# Patient Record
Sex: FEMALE | Race: WHITE | HISPANIC OR LATINO | Employment: OTHER | URBAN - METROPOLITAN AREA
[De-identification: names, ages, dates, MRNs, and addresses within clinical notes are randomized per-mention and may not be internally consistent; named-entity substitution may affect disease eponyms.]

---

## 2021-04-30 ENCOUNTER — TRANSCRIBE ORDERS (OUTPATIENT)
Dept: PHYSICAL THERAPY | Facility: CLINIC | Age: 51
End: 2021-04-30

## 2021-04-30 DIAGNOSIS — M79.18 MYALGIA, OTHER SITE: Primary | ICD-10-CM

## 2021-05-05 ENCOUNTER — TREATMENT (OUTPATIENT)
Dept: PHYSICAL THERAPY | Facility: CLINIC | Age: 51
End: 2021-05-05

## 2021-05-05 DIAGNOSIS — G89.29 CHRONIC LOW BACK PAIN, UNSPECIFIED BACK PAIN LATERALITY, UNSPECIFIED WHETHER SCIATICA PRESENT: ICD-10-CM

## 2021-05-05 DIAGNOSIS — M54.50 CHRONIC LOW BACK PAIN, UNSPECIFIED BACK PAIN LATERALITY, UNSPECIFIED WHETHER SCIATICA PRESENT: ICD-10-CM

## 2021-05-05 DIAGNOSIS — M79.18 MYALGIA, OTHER SITE: Primary | ICD-10-CM

## 2021-05-05 PROCEDURE — 97110 THERAPEUTIC EXERCISES: CPT | Performed by: PHYSICAL THERAPIST

## 2021-05-05 PROCEDURE — 97162 PT EVAL MOD COMPLEX 30 MIN: CPT | Performed by: PHYSICAL THERAPIST

## 2021-05-05 NOTE — PROGRESS NOTES
Physical Therapy Initial Evaluation and Plan of Care    Patient: Marialuisa Pelletier   : 1970  Diagnosis/ICD-10 Code:  Myalgia, other site [M79.18]  Referring practitioner: RUPA Velez  Date of Initial Visit: 2021  Today's Date: 2021  Patient seen for 1 sessions           Subjective Questionnaire: Oswestry: 36% limited      Subjective Evaluation    History of Present Illness  Mechanism of injury: Portions taken from MD note & confirmed with pt: Pt reports chronic LBP for ~1.5-2 yrs or more with B LE pain & n/t for SPENCER x10 yrs with symptoms more at the feet more recently and into B groin. Testing r/o vascular etiology for leg symptoms. Pt reports tailbone pain with sitting ~1.5 yrs now.  Pt has been seen in pain mgmt and had prior surgical eval with Dr. Scott. Cymbalta was not tolerated due to side effects, Lyrica has not helped, but current med regimen is Gabapentin 800 mg 3x/day and Tramadol 50 mg 2x/day. Pt will have trigger point injex in the lumb region on .    Denies bowel/bladder incontinence, urinary retention or saddle anesthesia    Prior MRI (3/2019) & EMG were unremarkable.    PMH: fibromyalgia, arthritis knees, 'low' uterus removed 4-5 yrs ago, meniscectomy R knee, gall bladder removed    Denies hx: pacemaker, CA, seizures, metal implants, CVA, MI    Pain: 9/10 current, 6/10 at best with meds, 10/10 at worst    Aggravating/functional factors: sitting, standing, walking, weight-bearing, laying down, bending, twisting, squatting, lifting, carrying, washing, dressing, grooming, pushing, pulling, stairs, in/out of car or bed, rising, sleeping, house work, yard work, work activities    PLOF: no prior issues with the above functional activities    Relieving factors: meds    Social Hx: lives with spouse w/adult kids, steps to get into house, 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 (standing, bending, twisting)      Pain  Quality: burning,  sharp, throbbing, dull ache and needle-like    Hand dominance: right    Treatments  Previous treatment: chiropractic (3 yrs)  Patient Goals  Patient goals for therapy: decreased pain and increased strength         Objective          Active Range of Motion     Lumbar   Flexion: 60 degrees with pain  Extension: 20 degrees with pain  Left lateral flexion: 35 degrees with pain  Right lateral flexion: 35 degrees with pain  Left rotation: 30 degrees with pain  Right rotation: 30 degrees with pain    Additional Active Range of Motion Details  Pain with repeated flex & ext but no overall change afterwards    Strength/Myotome Testing     Left Hip   Planes of Motion   Flexion: 5  Internal rotation: 4-    Right Hip   Planes of Motion   Flexion: 4+  Internal rotation: 4-    Left Knee   Flexion: 4+  Extension: 4+    Right Knee   Flexion: 4+  Extension: 4    Left Ankle/Foot   Dorsiflexion: 5  Inversion: 4+  Eversion: 4+  Great toe extension: 4-    Right Ankle/Foot   Dorsiflexion: 5  Inversion: 5  Eversion: 5  Great toe extension: 4    Additional Strength Details  Pain with MMT toe ext B  H/L hip abd maria dolores min/mod resistance & hip add maria dolores min resistance  Hip IR MMT aggravated B knees    Muscle Activation     Additional Muscle Activation Details  Will assess in future visits    Tests     Lumbar     Left   Positive passive SLR.   Negative vertical compression.     Right   Negative vertical compression.     Left Hip   Positive Ely's and femoral nerve tension.   90/90 SLR: Positive.     Right Hip   Positive Ely's and femoral nerve tension.   90/90 SLR: Positive.     Additional Tests Details  Tight hams R side SLR      Observation: shifting weight in sitting to avoid staying in one place too long, also moves legs around frequently during sitting    Palpation: no TTP along thor/lumb paraspinals today, TTP @ sacral ILAs; R on L & R on R sacrum in sacral flex/ext respectively; L PINN; R > L outflare; PIVM more limited L UPAs than  R    Sensation: intact/equal to LT B LEs    Posture: head fwd/rounded shoulders    DTRs: 2+ B LEs patellar/achilles    Clonus: (-)    Gait: I without AD, able to maintain fairly straight path, slightly reduced gt speed    Balance: SLS 5 s L/6 s R on level ground without UE support & SBA    Transfers: supine to/from sit with poor body mechanics      Assessment & Plan     Assessment  Impairments: abnormal coordination, abnormal gait, abnormal muscle firing, abnormal muscle tone, abnormal or restricted ROM, activity intolerance, impaired balance, impaired physical strength, lacks appropriate home exercise program, pain with function, safety issue and weight-bearing intolerance  Assessment details: The patient is a 51 y.o. female who presents to physical therapy today for myalgia & chronic LBP. Upon initial evaluation, the patient demonstrates the above & following impairments: pain, reduced posture, SI/IS dysfunction, (+) SLR L, (+) Femoral n test B, decreased ROM/flexibility, strength, gait, balance and function. Due to these impairments, the patient is unable to/limited with: sitting, standing, walking, weight-bearing, laying down, bending, twisting, squatting, lifting, carrying, washing, dressing, grooming, pushing, pulling, stairs, in/out of car or bed, rising, sleeping, house work, yard work, work activities. The patient would benefit from skilled PT services to address functional limitations and impairments and to improve patient quality of life.      Barriers to therapy: fibromyalgia, arthritis knees and pt's work activities could affect PT Rx/progress/outcomes if exacerbated/unregulated which could affect tolerance to PT/exs or delay healing  Prognosis: good    Goals  Plan Goals: STGs in 4 weeks:  Decrease pain to 6-7/10 on average  Increase trunk/LE ROM by 10 degrees where limited as much  Increase LE strength to 4/5 or better    LTGs by discharge  Increase trunk/LE ROM to WFL/WNL  Increase LE strength to 5/5    Pt will be able to ascend/descend stairs reciprocally with or without use of rail(s) and with minimal difficulty or pain  Pt will be able to sit/drive/ride for 30-60 mins without difficulty or pain  Pt will be able to stand 60 mins for basic ADLs/work activities without difficulty or pain  Pt will be able to walk 30-60 mins for grocery shopping/work activities without difficulty, pain or LOB  Pt will be able to wash/dress/groom without difficulty or increased pain  Pt will be able to lift/carry laundry baskets, pots/pans, garbage or grocery bags without difficulty or increased pain  Pt will be able to sleep full nights most nights without waking from LBP/LE symptoms  Pt will be able to perform job activities with minimal difficulty or pain        Plan  Therapy options: will be seen for skilled physical therapy services  Planned modality interventions: cryotherapy, thermotherapy (hydrocollator packs), electrical stimulation/Malawian stimulation, ultrasound, dry needling and traction  Planned therapy interventions: manual therapy, neuromuscular re-education, postural training, soft tissue mobilization, spinal/joint mobilization, strengthening, stretching, therapeutic activities, transfer training, abdominal trunk stabilization, ADL retraining, body mechanics training, home exercise program, gait training, functional ROM exercises, flexibility, motor coordination training, balance/weight-bearing training and joint mobilization  Frequency: 3x week  Treatment plan discussed with: patient  Plan details: 30 visits; 90 day POC      History # of Personal Factors and/or Comorbidities: HIGH (3+)  Examination of Body System(s): # of elements: HIGH (4+)  Clinical Presentation: EVOLVING  Clinical Decision Making: MODERATE      Timed:         Manual Therapy:         mins  27936;     Therapeutic Exercise:    10     mins  60664;     Neuromuscular Kay:        mins  04849;    Therapeutic Activity:          mins  75341;     Gait  Training:           mins  25015;     Ultrasound:          mins  09613;    Ionto                                   mins   19778  Self Care                            mins   46101  Canalith Repos         mins 02841      Un-Timed:  Electrical Stimulation:         mins  52390 ( );  Dry Needling          mins self-pay  Traction          mins 36347  Low Eval          Mins  18756  Mod Eval    28      Mins  78161  High Eval                            Mins  03638  Re-Eval                               mins  14141        Timed Treatment:  10    mins   Total Treatment:     38   mins    PT SIGNATURE: Zoraida Schultz, PT   [place] PT Lic# [id number]  DATE TREATMENT INITIATED: 5/5/2021    Initial Certification  Certification Period: 8/3/2021  I certify that the therapy services are furnished while this patient is under my care.  The services outlined above are required by this patient, and will be reviewed every 90 days.     PHYSICIAN: Sabiha Oneill PA      DATE:     Please sign and return via fax to [phone]. Thank you, [facility].

## 2021-05-11 ENCOUNTER — TREATMENT (OUTPATIENT)
Dept: PHYSICAL THERAPY | Facility: CLINIC | Age: 51
End: 2021-05-11

## 2021-05-11 DIAGNOSIS — M79.18 MYALGIA, OTHER SITE: Primary | ICD-10-CM

## 2021-05-11 DIAGNOSIS — M54.50 CHRONIC LOW BACK PAIN, UNSPECIFIED BACK PAIN LATERALITY, UNSPECIFIED WHETHER SCIATICA PRESENT: ICD-10-CM

## 2021-05-11 DIAGNOSIS — G89.29 CHRONIC LOW BACK PAIN, UNSPECIFIED BACK PAIN LATERALITY, UNSPECIFIED WHETHER SCIATICA PRESENT: ICD-10-CM

## 2021-05-11 PROCEDURE — 97140 MANUAL THERAPY 1/> REGIONS: CPT | Performed by: PHYSICAL THERAPIST

## 2021-05-11 PROCEDURE — 97110 THERAPEUTIC EXERCISES: CPT | Performed by: PHYSICAL THERAPIST

## 2021-05-11 PROCEDURE — G0283 ELEC STIM OTHER THAN WOUND: HCPCS | Performed by: PHYSICAL THERAPIST

## 2021-05-11 NOTE — PROGRESS NOTES
Physical Therapy Daily Progress Note    VISIT#: 2    Subjective   Marialuisa Pelletier reports her back pain is no better and just doesn't feel like this is going to help.  Current Pain Level: 9/10    Objective     See Exercise, Manual, and Modality Logs for complete treatment.     Patient Education: added LTR and seated trunk flexion stretch to her HEP.     Assessment/Plan  LAD did not make the patients pain worse however she did not report ay relief following the treatment. She tolerated the exercises with reports of mild pain shooting into the B groin area. Movements were slow and guarded. Minimal relief reported following the ESTIM/heat.     Progress exercises as tolerated.  Progress per Plan of Care    Goals  Plan Goals: STGs in 4 weeks:  Decrease pain to 6-7/10 on average  Increase trunk/LE ROM by 10 degrees where limited as much  Increase LE strength to 4/5 or better    LTGs by discharge  Increase trunk/LE ROM to WFL/WNL  Increase LE strength to 5/5   Pt will be able to ascend/descend stairs reciprocally with or without use of rail(s) and with minimal difficulty or pain  Pt will be able to sit/drive/ride for 30-60 mins without difficulty or pain  Pt will be able to stand 60 mins for basic ADLs/work activities without difficulty or pain  Pt will be able to walk 30-60 mins for grocery shopping/work activities without difficulty, pain or LOB  Pt will be able to wash/dress/groom without difficulty or increased pain  Pt will be able to lift/carry laundry baskets, pots/pans, garbage or grocery bags without difficulty or increased pain  Pt will be able to sleep full nights most nights without waking from LBP/LE symptoms  Pt will be able to perform job activities with minimal difficulty or pain          Timed:         Manual Therapy:    10     mins  66861;     Therapeutic Exercise:    35     mins  02106;       Un-Timed:  Electrical Stimulation:    15     mins  50674 ( );      Timed Treatment:   45   mins   Total  Treatment:     65   mins      Fabby Grajeda, ABILIO    Physical Therapist Assistant

## 2021-05-13 ENCOUNTER — TREATMENT (OUTPATIENT)
Dept: PHYSICAL THERAPY | Facility: CLINIC | Age: 51
End: 2021-05-13

## 2021-05-13 DIAGNOSIS — M54.50 CHRONIC LOW BACK PAIN, UNSPECIFIED BACK PAIN LATERALITY, UNSPECIFIED WHETHER SCIATICA PRESENT: ICD-10-CM

## 2021-05-13 DIAGNOSIS — G89.29 CHRONIC LOW BACK PAIN, UNSPECIFIED BACK PAIN LATERALITY, UNSPECIFIED WHETHER SCIATICA PRESENT: ICD-10-CM

## 2021-05-13 DIAGNOSIS — M79.18 MYALGIA, OTHER SITE: Primary | ICD-10-CM

## 2021-05-13 PROCEDURE — 97110 THERAPEUTIC EXERCISES: CPT | Performed by: PHYSICAL THERAPIST

## 2021-05-13 PROCEDURE — G0283 ELEC STIM OTHER THAN WOUND: HCPCS | Performed by: PHYSICAL THERAPIST

## 2021-05-13 PROCEDURE — 97140 MANUAL THERAPY 1/> REGIONS: CPT | Performed by: PHYSICAL THERAPIST

## 2021-05-13 NOTE — PROGRESS NOTES
Physical Therapy Daily Progress Note    VISIT#: 3    Subjective   Marialuisa Pelletier reports she felt a lot better the rest of the day after her last appointment. But yesterday morning she woke up with a lot of pain and she isn't sure why. She had a chiropractor appointment and he was able to pop her back and give her some relief.   Current Pain Level: 7/10    Objective     See Exercise, Manual, and Modality Logs for complete treatment.     Patient Education: added diagonal trunk flexion stretch to her HEP.    Assessment/Plan  Slight decrease in her subjective pain level when compared to her last visit. Better tolerance to her exercises and is able to perform them 1-2x a day at home. Some relief reported today following the LAD.    Plan: If symptoms in LB are exacerbated again stop LAD. Progress exercises as tolerated. Teach NP/PT next visit  Progress per Plan of Care     Goals  Plan Goals: STGs in 4 weeks:  Decrease pain to 6-7/10 on average  Increase trunk/LE ROM by 10 degrees where limited as much  Increase LE strength to 4/5 or better    LTGs by discharge  Increase trunk/LE ROM to WFL/WNL  Increase LE strength to 5/5   Pt will be able to ascend/descend stairs reciprocally with or without use of rail(s) and with minimal difficulty or pain  Pt will be able to sit/drive/ride for 30-60 mins without difficulty or pain  Pt will be able to stand 60 mins for basic ADLs/work activities without difficulty or pain  Pt will be able to walk 30-60 mins for grocery shopping/work activities without difficulty, pain or LOB  Pt will be able to wash/dress/groom without difficulty or increased pain  Pt will be able to lift/carry laundry baskets, pots/pans, garbage or grocery bags without difficulty or increased pain  Pt will be able to sleep full nights most nights without waking from LBP          Timed:         Manual Therapy:    12     mins  69865;     Therapeutic Exercise:    30     mins  09293;         Un-Timed:  Electrical  Stimulation:    15     mins  72073 ( );  Bike: 5 min (no charge)      Timed Treatment:   47   mins   Total Treatment:     62   mins      Fabby Grajeda PTA    Physical Therapist Assistant

## 2021-07-15 ENCOUNTER — TREATMENT (OUTPATIENT)
Dept: PHYSICAL THERAPY | Facility: CLINIC | Age: 51
End: 2021-07-15

## 2021-07-15 ENCOUNTER — TRANSCRIBE ORDERS (OUTPATIENT)
Dept: PHYSICAL THERAPY | Facility: CLINIC | Age: 51
End: 2021-07-15

## 2021-07-15 ENCOUNTER — DOCUMENTATION (OUTPATIENT)
Dept: PHYSICAL THERAPY | Facility: CLINIC | Age: 51
End: 2021-07-15

## 2021-07-15 DIAGNOSIS — M99.05 SEGMENTAL AND SOMATIC DYSFUNCTION OF PELVIC REGION: ICD-10-CM

## 2021-07-15 DIAGNOSIS — M99.04 SEGMENTAL AND SOMATIC DYSFUNCTION OF SACRAL REGION: ICD-10-CM

## 2021-07-15 DIAGNOSIS — M25.551 RIGHT HIP PAIN: Primary | ICD-10-CM

## 2021-07-15 PROCEDURE — 97140 MANUAL THERAPY 1/> REGIONS: CPT | Performed by: PHYSICAL THERAPIST

## 2021-07-15 PROCEDURE — 97162 PT EVAL MOD COMPLEX 30 MIN: CPT | Performed by: PHYSICAL THERAPIST

## 2021-07-15 NOTE — PROGRESS NOTES
Physical Therapy Initial Evaluation and Plan of Care    Patient: Marialuisa Pelletier   : 1970  Diagnosis/ICD-10 Code:  Right hip pain [M25.551]  Referring practitioner: Low Lyn MD  Date of Initial Visit: 7/15/2021  Today's Date: 7/15/2021  Patient seen for 1 sessions           Subjective Questionnaire: OHS = 2348 = 47.92% ability      Subjective Evaluation    History of Present Illness  Mechanism of injury: Pt reports pain wraps around the R hip A/P and even into the groin at times. Pt was seen prior for the LB with injex ~ with relief for about 3 weeks. Pt reports burning/stabbing pain and tingling down B LEs which is constant which was to the ankles initially and over the last year is in the feet as well. Pt has been doing HEP for LB but over the last couple of weeks it's worsened. Pt states pain varies. Pt is scheduled for an injex on the L for . Pt RMD in 2 months.     Lumb MRI (3/2019) & EMG were unremarkable. Hip xray & MD exam were unremarkable per pt. Pt may require MRI if not improved. MD gave Mobic and ordered PT.     PMH: fibromyalgia, arthritis knees, 'low' uterus removed 4-5 yrs ago, sx meniscectomy R knee, pt notes OA B knees & holding off on TKA as long as possible (supposed to get injex  B knees) gall bladder removed    Denies hx: pacemaker, metal implants, CA, CVA, seizures, MI    Pain: 7/10 current, 5/10 at best, 9/10 at worst    Aggravating/functional factors: weather changes, sitting, standing, walking, weight-bearing, laying down especially R side or LB, bending, twisting, squatting, lifting, carrying, donning/doffing socks/shoes, shaving legs, washing/drying, pushing, pulling, stairs, in/out of car or bed, rising, sleeping, house work, yard work, work activities    PLOF: prior issues with the above functional activities due to knees and back in the past    Relieving factors: injex helped back and legs    Social Hx: lives with spouse, 3 steps in house no rail, 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, lifting, 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 day 7 days per week for 6 months straight)      Treatments  Previous treatment: physical therapy and injection treatment  Patient Goals  Patient goals for therapy: decreased pain, improved balance, increased strength, independence with ADLs/IADLs, return to sport/leisure activities and increased motion  Patient goal: work without difficulty or pain         Objective          Active Range of Motion     Lumbar   Flexion: WFL  Extension: 27 degrees with pain  Left lateral flexion: 20 degrees with pain  Right lateral flexion: 25 degrees with pain  Left rotation: WFL  Right rotation: with pain  Left Hip   Flexion: WFL and with pain  External rotation (90/90): 35 degrees   Internal rotation (90/90): 20 degrees     Right Hip   Flexion: 110 degrees with pain  External rotation (90/90): 30 degrees with pain  Internal rotation (90/90): 20 degrees with pain    Additional Active Range of Motion Details  L hip flex inc R hip pain    Strength/Myotome Testing     Left Hip   Planes of Motion   Flexion: 4  External rotation: 4+  Internal rotation: 4-    Right Hip   Planes of Motion   Flexion: 4+  External rotation: 4+  Internal rotation: 4+    Additional Strength Details  Pain R hip with all & R knee with ext    L hip flex inc R hip pain  L hip IR no pain but weak    R ankle EV 4- painful lat ankle    Tests     Lumbar   Positive repeated extension.   Negative repeated flexion.     Additional Tests Details  Reps ext increased LBP, but no change hip pain   SLR tight hams B  90/90 (+) B  Slump tight hams B  HARINI (+) R, tight L  FADIR (+)  Scour (+)  SI/hip compression at 90/90 (+) no relief after distrx w/oscillations in that position  Femoral N tension tight at ant thigh B     Vitals: HR 65, SpO2 99%    Observation: shifting weight in sitting to avoid staying in one place too long, also moves legs around frequently during sitting; 1 cm LLD (L leg shorter  than R; discussed trial of OTC shoe insert on L to start with)    Palpation: significant TTP at R gt troch; no TTP along thor/lumb paraspinals today, TTP @ sacral JOHN R; R on L & R on R sacrum in sacral flex/ext respectively; L PINN; R > L outflare; L UPAs hypomobile vs R; R hip hypomobile with lat glide & distrx (will assess further in future visits prn)    Sensation: intact/equal to LT B LEs     Posture: head fwd/rounded shoulders     DTRs: 2+ B LEs patellar/achilles     Clonus: (-)    Gait: I without AD, slightly reduced gt speed, Trendelenburg    Balance: SLS 8 s L/10 s R on level ground without UE support & SBA    Transfers/body Mechanics: I with sit to stand, bed mobility and supine to sit; slow and cautious with moving at times    Assessment & Plan     Assessment  Impairments: abnormal coordination, abnormal gait, abnormal muscle firing, abnormal muscle tone, abnormal or restricted ROM, activity intolerance, impaired balance, impaired physical strength, lacks appropriate home exercise program, pain with function, safety issue and weight-bearing intolerance  Assessment details: The patient is a 51 y.o. female who presents to physical therapy today for R hip pain.  Upon initial evaluation, the patient demonstrates the above & following impairments: pain, tenderness at R gt troch, reduced posture, SI/IS dysfunction, decreased ROM/flexibility, strength, gait, balance and function. Due to these impairments, the patient is unable to/limited with:  sitting, standing, walking, weight-bearing, laying down especially R side or LB, bending, twisting, squatting, lifting, carrying, donning/doffing socks/shoes, shaving legs, washing/drying, pushing, pulling, stairs, in/out of car or bed, rising, sleeping, house work, yard work, work activities. The patient would benefit from skilled PT services to address functional limitations and impairments and to improve patient quality of life.      Barriers to therapy: activity level  (work 7 days/wk 12-13 hr days for 6 months of year), fibromyalgia, knee arthritis B (not having surgery yet) could affect PT Rx/progress/outcomes if exacerbated/unregulated which could affect tolerance to PT/exs or delay healing  Prognosis: good    Goals  Plan Goals: STGs in 4 weeks:  Decrease pain to 6-7/10 on average  Increase LE ROM by 10 degrees where limited as much  Increase LE strength to 4/5  Improve pelvic/sacral alignment during sessions    LTGs by discharge  Increase R hip AROM to WFL/WNL and pain free  Increase LE strength to 5/5   Pt will be able to ascend/descend stairs reciprocally with or without use of rail(s) and with minimal difficulty or pain  Pt will be able to sit/drive/ride for 30-60 mins without difficulty or pain  Pt will be able to stand 2 hrs consecutively for basic ADLs/work activities without difficulty or pain  Pt will be able to walk 30-60 mins for grocery shopping without difficulty, pain or LOB  Pt will be able to wash/dress/groom without difficulty or increased pain  Pt will be able to lift/carry laundry baskets, pots/pans, garbage or grocery bags without difficulty or increased pain  Pt will be able to sleep full nights most nights without waking from LBP/LE symptoms      Plan  Therapy options: will be seen for skilled physical therapy services  Planned modality interventions: cryotherapy, thermotherapy (hydrocollator packs), electrical stimulation/[place]oz[place] stimulation, ultrasound and dry needling  Planned therapy interventions: manual therapy, neuromuscular re-education, postural training, soft tissue mobilization, spinal/joint mobilization, strengthening, stretching, therapeutic activities, transfer training, abdominal trunk stabilization, ADL retraining, body mechanics training, home exercise program, gait training, functional ROM exercises, flexibility, motor coordination training, balance/weight-bearing training and joint mobilization  Other planned therapy interventions:  leyda IASSILVER  Treatment plan discussed with: patient  Plan details: 30 visits; 90 day POC        History # of Personal Factors and/or Comorbidities: HIGH (3+)  Examination of Body System(s): # of elements: HIGH (4+)  Clinical Presentation: EVOLVING  Clinical Decision Making: MODERATE      Timed:         Manual Therapy:    8     mins  05824;     Therapeutic Exercise:   3      mins  96672;     Neuromuscular Kay:        mins  39508;    Therapeutic Activity:          mins  60903;     Gait Training:           mins  86386;     Ultrasound:          mins  83245;    Ionto                                   mins   84187  Self Care                            mins   03978  Canalith Repos         mins 88229      Un-Timed:  Electrical Stimulation:         mins  42383 ( );  Dry Needling          mins self-pay  Traction          mins 19455  Low Eval          Mins  14505  Mod Eval    49      Mins  80169  High Eval                            Mins  22093  Re-Eval                               mins  30277        Timed Treatment:  11    mins   Total Treatment:     60   mins    PT SIGNATURE: Zoraida Schultz, PT   [place] PT Lic# [id number]  DATE TREATMENT INITIATED: 7/15/2021    Initial Certification  Certification Period: 10/13/2021  I certify that the therapy services are furnished while this patient is under my care.  The services outlined above are required by this patient, and will be reviewed every 90 days.     PHYSICIAN: Low Lyn MD      DATE:     Please sign and return via fax to [phone]. Thank you, [facility].

## 2021-07-15 NOTE — PROGRESS NOTES
Discharge Summary  Discharge Summary from Physical Therapy Report      Dates  PT visit: 5/5/21-5/18/21  Number of Visits: 3    Discharge Status of Patient: Pt was seen 3 visits, then got injex in the spine. Pt never returned to PT and has not been seen in over 2 months and is therefore discharged.     Goals: undetermined as pt never returned to PT and arrives today for a new issue.     Discharge Plan: no D/C instructions were given as pt never returned to PT after the 3rd visit.     Comments: pt was given HEP during sessions    Date of Discharge: 7/15/21      Zoraida Schultz PT  Physical Therapist

## 2021-07-20 ENCOUNTER — TELEPHONE (OUTPATIENT)
Dept: PHYSICAL THERAPY | Facility: CLINIC | Age: 51
End: 2021-07-20

## 2021-07-22 ENCOUNTER — TREATMENT (OUTPATIENT)
Dept: PHYSICAL THERAPY | Facility: CLINIC | Age: 51
End: 2021-07-22

## 2021-07-22 DIAGNOSIS — M99.04 SEGMENTAL AND SOMATIC DYSFUNCTION OF SACRAL REGION: ICD-10-CM

## 2021-07-22 DIAGNOSIS — M99.05 SEGMENTAL AND SOMATIC DYSFUNCTION OF PELVIC REGION: ICD-10-CM

## 2021-07-22 DIAGNOSIS — M25.551 RIGHT HIP PAIN: Primary | ICD-10-CM

## 2021-07-22 PROCEDURE — 97110 THERAPEUTIC EXERCISES: CPT | Performed by: PHYSICAL THERAPIST

## 2021-07-22 PROCEDURE — 97140 MANUAL THERAPY 1/> REGIONS: CPT | Performed by: PHYSICAL THERAPIST

## 2021-07-22 NOTE — PROGRESS NOTES
Physical Therapy Daily Progress Note    VISIT#: 2    Subjective   Marialuisa Pelletier reports she is feeling a lot better this morning. She thinks the stretches and the medicine they put her on her really made a difference. She gets an injection in her hip later this afternoon.  Current Pain Level: 5/10    Objective     See Exercise, Manual, and Modality Logs for complete treatment.     Patient Education: added supine hip flexor stretch. Patient denied handout.    Assessment/Plan  Patient has felt an improvement in her symptoms since starting PT and with the new meds given by her MD. Pt reported increased pain along the anterior hip with fig 4 push away stretch so had her back off the amount of intensity. Pain decreased to a 3/10 by the end of her treatment.    Progress per Plan of Care    Goals  Plan Goals: STGs in 4 weeks:  Decrease pain to 6-7/10 on average  Increase LE ROM by 10 degrees where limited as much  Increase LE strength to 4/5  Improve pelvic/sacral alignment during sessions    LTGs by discharge  Increase R hip AROM to WFL/WNL and pain free  Increase LE strength to 5/5   Pt will be able to ascend/descend stairs reciprocally with or without use of rail(s) and with minimal difficulty or pain  Pt will be able to sit/drive/ride for 30-60 mins without difficulty or pain  Pt will be able to stand 2 hrs consecutively for basic ADLs/work activities without difficulty or pain  Pt will be able to walk 30-60 mins for grocery shopping without difficulty, pain or LOB  Pt will be able to wash/dress/groom without difficulty or increased pain  Pt will be able to lift/carry laundry baskets, pots/pans, garbage or grocery bags without difficulty or increased pain  Pt will be able to sleep full nights most nights without waking from LBP/LE symptoms          Timed:         Manual Therapy:    17     mins  91440;     Therapeutic Exercise:    17     mins  63925;       Un-Timed:      Timed Treatment:   34   mins   Total Treatment:      34   mins      Fabby Grajeda, PTA    Physical Therapist Assistant

## 2021-07-27 ENCOUNTER — TREATMENT (OUTPATIENT)
Dept: PHYSICAL THERAPY | Facility: CLINIC | Age: 51
End: 2021-07-27

## 2021-07-27 DIAGNOSIS — M25.551 RIGHT HIP PAIN: Primary | ICD-10-CM

## 2021-07-27 DIAGNOSIS — M99.04 SEGMENTAL AND SOMATIC DYSFUNCTION OF SACRAL REGION: ICD-10-CM

## 2021-07-27 DIAGNOSIS — M99.05 SEGMENTAL AND SOMATIC DYSFUNCTION OF PELVIC REGION: ICD-10-CM

## 2021-07-27 PROCEDURE — 97140 MANUAL THERAPY 1/> REGIONS: CPT | Performed by: PHYSICAL THERAPIST

## 2021-07-27 PROCEDURE — 97110 THERAPEUTIC EXERCISES: CPT | Performed by: PHYSICAL THERAPIST

## 2021-07-27 PROCEDURE — 97112 NEUROMUSCULAR REEDUCATION: CPT | Performed by: PHYSICAL THERAPIST

## 2021-07-27 NOTE — PROGRESS NOTES
"Physical Therapy Daily Progress Note    VISIT#: 3    Subjective   Marialuisa Pelletier reports she received her injection in the anterior hip and it has been so much better. \"I can actually tolerate working without being in tears.\" She still has a little ache but it doesn't keep her from doing anything. She still gets some sharp pains at the top of her R glute. She is getting an injection there sometime in August. She also reports she has significantly decreased the amount of medicine she has had to take to control her pain.  Current Pain Level: 3/10    Objective     See Exercise, Manual, and Modality Logs for complete treatment.     Patient Education: added PPT, BKFO, table tops and SLR    Assessment/Plan  Patient is responding very well to her injections and consistency with her exercises. She has been able to decrease her medications and do her job with less restriction from pain. Due to her pain level being more controlled we were able to begin working on core activation/strengthening. She did present with some hip flexor weakness as seen with table tops and SLR's. Demonstrated good understanding of core activation.    Progress per Plan of Care     Goals  Plan Goals: STGs in 4 weeks:  Decrease pain to 6-7/10 on average  Increase LE ROM by 10 degrees where limited as much  Increase LE strength to 4/5  Improve pelvic/sacral alignment during sessions    LTGs by discharge  Increase R hip AROM to WFL/WNL and pain free  Increase LE strength to 5/5   Pt will be able to ascend/descend stairs reciprocally with or without use of rail(s) and with minimal difficulty or pain  Pt will be able to sit/drive/ride for 30-60 mins without difficulty or pain  Pt will be able to stand 2 hrs consecutively for basic ADLs/work activities without difficulty or pain  Pt will be able to walk 30-60 mins for grocery shopping without difficulty, pain or LOB  Pt will be able to wash/dress/groom without difficulty or increased pain  Pt will be able to " lift/carry laundry baskets, pots/pans, garbage or grocery bags without difficulty or increased pain  Pt will be able to sleep full nights most nights without waking from LBP/LE symptoms          Timed:         Manual Therapy:    13     mins  12755;     Therapeutic Exercise:    13     mins  06406;     Neuromuscular Kay:    22    mins  41191;        Un-Timed:        Timed Treatment:   48   mins   Total Treatment:     48   mins      Fabby Grajeda PTA    Physical Therapist Assistant

## 2021-07-29 ENCOUNTER — TREATMENT (OUTPATIENT)
Dept: PHYSICAL THERAPY | Facility: CLINIC | Age: 51
End: 2021-07-29

## 2021-07-29 DIAGNOSIS — M99.04 SEGMENTAL AND SOMATIC DYSFUNCTION OF SACRAL REGION: ICD-10-CM

## 2021-07-29 DIAGNOSIS — M99.05 SEGMENTAL AND SOMATIC DYSFUNCTION OF PELVIC REGION: ICD-10-CM

## 2021-07-29 DIAGNOSIS — M25.551 RIGHT HIP PAIN: Primary | ICD-10-CM

## 2021-07-29 PROCEDURE — 97110 THERAPEUTIC EXERCISES: CPT | Performed by: PHYSICAL THERAPIST

## 2021-07-29 PROCEDURE — 97530 THERAPEUTIC ACTIVITIES: CPT | Performed by: PHYSICAL THERAPIST

## 2021-07-29 PROCEDURE — 97112 NEUROMUSCULAR REEDUCATION: CPT | Performed by: PHYSICAL THERAPIST

## 2021-07-29 NOTE — PROGRESS NOTES
Physical Therapy Daily Progress Note    VISIT#: 4    Subjective   Marialuisa Pelletier reports she continues to do well. She still has some pain at the top of the R glute but is hoping the injection will help that spot as much as it has helped the front of her hip. Just some mild soreness following the new core exercises from last time.  Current Pain Level: 5/10    Objective     See Exercise, Manual, and Modality Logs for complete treatment.     Patient Education: added bridges w/ core activation, clamshells and standing marches and side steps. Denied handouts.    Assessment/Plan  The R anterior remains pain free. The stretches keep her posterior hip from getting worse but pain level there remains about the same. She is getting an injection there sometime in August. Good understanding of core activation and progressed exercises.    Progress per Plan of Care     Goals  Plan Goals: STGs in 4 weeks:  Decrease pain to 6-7/10 on average  Increase LE ROM by 10 degrees where limited as much  Increase LE strength to 4/5  Improve pelvic/sacral alignment during sessions    LTGs by discharge  Increase R hip AROM to WFL/WNL and pain free  Increase LE strength to 5/5   Pt will be able to ascend/descend stairs reciprocally with or without use of rail(s) and with minimal difficulty or pain  Pt will be able to sit/drive/ride for 30-60 mins without difficulty or pain  Pt will be able to stand 2 hrs consecutively for basic ADLs/work activities without difficulty or pain  Pt will be able to walk 30-60 mins for grocery shopping without difficulty, pain or LOB  Pt will be able to wash/dress/groom without difficulty or increased pain  Pt will be able to lift/carry laundry baskets, pots/pans, garbage or grocery bags without difficulty or increased pain  Pt will be able to sleep full nights most nights without waking from LBP/LE symptoms          Timed:            Therapeutic Exercise:    12     mins  68577;     Neuromuscular Kay:    22    mins   97896;    Therapeutic Activity:     10     mins  33981;         Un-Timed:  NuStep: 5 min (no charge)      Timed Treatment:   44   mins   Total Treatment:     49   mins      Fabby Grajeda PTA    Physical Therapist Assistant

## 2021-08-05 ENCOUNTER — TREATMENT (OUTPATIENT)
Dept: PHYSICAL THERAPY | Facility: CLINIC | Age: 51
End: 2021-08-05

## 2021-08-05 DIAGNOSIS — M99.04 SEGMENTAL AND SOMATIC DYSFUNCTION OF SACRAL REGION: ICD-10-CM

## 2021-08-05 DIAGNOSIS — M25.551 RIGHT HIP PAIN: Primary | ICD-10-CM

## 2021-08-05 DIAGNOSIS — M99.05 SEGMENTAL AND SOMATIC DYSFUNCTION OF PELVIC REGION: ICD-10-CM

## 2021-08-05 PROCEDURE — 97530 THERAPEUTIC ACTIVITIES: CPT | Performed by: PHYSICAL THERAPIST

## 2021-08-05 PROCEDURE — 97112 NEUROMUSCULAR REEDUCATION: CPT | Performed by: PHYSICAL THERAPIST

## 2021-08-05 PROCEDURE — 97110 THERAPEUTIC EXERCISES: CPT | Performed by: PHYSICAL THERAPIST

## 2021-08-05 NOTE — PROGRESS NOTES
Physical Therapy Daily Progress Note    VISIT#: 5    Subjective   Marialuisa Pelletier reports she received the injection in the back of her hip on Monday and has been doing very well. If she works too long or sometimes when she sleeps her pain can increase to a 6/10 but its very infrequent since getting the injection.  Current Pain Level: 0/10    Objective     See Exercise, Manual, and Modality Logs for complete treatment.     Patient Education: added standing hip AROM and mini squats to her HEP. Handouts given.    Assessment/Plan  Overall patient has seen a lot of improvement since receiving both of her injections. Progressed her exercises to more standing core activation exercises. Attempted to do the 4 way hip w/ TB but the resistance was too much so reduced it to just AROM hip ROM. Patient appeared more deconditioned with standing activities and required cueing for proper form and core activation.     Next Visit: 30 Day Progress Note  Progress per Plan of Care     Goals  Plan Goals: STGs in 4 weeks:  Decrease pain to 6-7/10 on average  Increase LE ROM by 10 degrees where limited as much  Increase LE strength to 4/5  Improve pelvic/sacral alignment during sessions    LTGs by discharge  Increase R hip AROM to WFL/WNL and pain free  Increase LE strength to 5/5   Pt will be able to ascend/descend stairs reciprocally with or without use of rail(s) and with minimal difficulty or pain  Pt will be able to sit/drive/ride for 30-60 mins without difficulty or pain  Pt will be able to stand 2 hrs consecutively for basic ADLs/work activities without difficulty or pain  Pt will be able to walk 30-60 mins for grocery shopping without difficulty, pain or LOB  Pt will be able to wash/dress/groom without difficulty or increased pain  Pt will be able to lift/carry laundry baskets, pots/pans, garbage or grocery bags without difficulty or increased pain  Pt will be able to sleep full nights most nights without waking from LBP/LE  symptoms          Timed:            Therapeutic Exercise:    10     mins  62460;     Neuromuscular Kay:    21    mins  45021;    Therapeutic Activity:     14     mins  24289;          Un-Timed:      Timed Treatment:   45   mins   Total Treatment:     45   mins      Fabby Grajeda PTA    Physical Therapist Assistant

## 2021-08-13 ENCOUNTER — TREATMENT (OUTPATIENT)
Dept: PHYSICAL THERAPY | Facility: CLINIC | Age: 51
End: 2021-08-13

## 2021-08-13 DIAGNOSIS — M79.18 MYALGIA, OTHER SITE: ICD-10-CM

## 2021-08-13 DIAGNOSIS — G89.29 CHRONIC LOW BACK PAIN, UNSPECIFIED BACK PAIN LATERALITY, UNSPECIFIED WHETHER SCIATICA PRESENT: ICD-10-CM

## 2021-08-13 DIAGNOSIS — M99.04 SEGMENTAL AND SOMATIC DYSFUNCTION OF SACRAL REGION: ICD-10-CM

## 2021-08-13 DIAGNOSIS — M99.05 SEGMENTAL AND SOMATIC DYSFUNCTION OF PELVIC REGION: ICD-10-CM

## 2021-08-13 DIAGNOSIS — M25.551 RIGHT HIP PAIN: Primary | ICD-10-CM

## 2021-08-13 DIAGNOSIS — M54.50 CHRONIC LOW BACK PAIN, UNSPECIFIED BACK PAIN LATERALITY, UNSPECIFIED WHETHER SCIATICA PRESENT: ICD-10-CM

## 2021-08-13 PROCEDURE — 97110 THERAPEUTIC EXERCISES: CPT | Performed by: PHYSICAL THERAPIST

## 2021-08-13 PROCEDURE — 97112 NEUROMUSCULAR REEDUCATION: CPT | Performed by: PHYSICAL THERAPIST

## 2021-08-13 PROCEDURE — 97530 THERAPEUTIC ACTIVITIES: CPT | Performed by: PHYSICAL THERAPIST

## 2021-08-13 NOTE — PROGRESS NOTES
Physical Therapy Daily Progress Note    VISIT#: 6     Subjective Questionnaire: 29/48 = 60.42% ability    Subjective   Marialuisa Pelletier reports she had injex in the back ~2 wks ago and hip ~3 wks ago and is doing better since then. Pt no longer has the pain in her legs, but notes that starting about 2 days after the back injex, her legs feel more heavy and difficult. Pt also feels like there's a wrinkle in her shoes under her feet in the arch B since then. Pt changed her shoes 3x and it's no different, but this is starting to subside. Pt has a pain mgmt follow up monthly, but doesn't need to return to referring unless symptoms worsen again.     Functionally, pt reports she's having an easier time doing her ADLs/work, but gets more sore toward the end of the day. Pain levels are more manageable, so pt forgets to take her meds because it doesn't hurt as much as it used to. Pt is still having difficulty/pain with sleep,     Pain Rating (0-10): 3-4 current, 4-5 averages, 2-3 best, 6-7 worst     Objective     Active Range of Motion      Lumbar   Flexion: WFL  Left lateral flexion: 35 degrees with pain R side  Right lateral flexion: 40 degrees w  Left rotation: WNL  Right rotation: WNL    Left Hip   Flexion: WFL and with pain  External rotation (90/90): 35 degrees   Internal rotation (90/90): 30 degrees      Right Hip   Flexion: 110 degrees with pain  External rotation (90/90): 30 degrees   Internal rotation (90/90): 30 degrees with pain    Additional Active Range of Motion Details  L hip flex inc R hip pain     Strength/Myotome Testing      Left Hip   Planes of Motion   Flexion: 4+  External rotation: 5  Internal rotation: 4+     Right Hip   Planes of Motion   Flexion: 4+  External rotation: 5  Internal rotation: 5-      Tests   Femoral N tension tight at ant thigh R    See Exercise, Manual, and Modality Logs for complete treatment.     Patient Education: cues for therex; reviewed progressions & form with  exs    Assessment/Plan Sacrum well aligned today. Pelvis aligned after manual. Some compensation noted with squats, but pt was able to correct form and limited range to right before compensation began. Added 1.5# wts to standing hip exs and tolerated increased reps with that as well. Pt declined modalities at end of session. Pt is doing well so recommend continuing 1x/wk with remaining visits unless symptoms require an increase in frequency. Pt has met 3 STG and partially met 1 STG. Continue skilled PT to progress mobility, strength and function and until goal attainment is achieved.       Goals  Plan Goals: STGs in 4 weeks:  Decrease pain to 6-7/10 on average Met  Increase LE ROM by 10 degrees where limited as much Partially Met  Increase LE strength to 4/5 Met  Improve pelvic/sacral alignment during sessions Met    LTGs by discharge  Increase R hip AROM to WFL/WNL and pain free  Increase LE strength to 5/5   Pt will be able to ascend/descend stairs reciprocally with or without use of rail(s) and with minimal difficulty or pain  Pt will be able to sit/drive/ride for 30-60 mins without difficulty or pain  Pt will be able to stand 2 hrs consecutively for basic ADLs/work activities without difficulty or pain  Pt will be able to walk 30-60 mins for grocery shopping without difficulty, pain or LOB   Pt will be able to wash/dress/groom without difficulty or increased pain  Pt will be able to lift/carry laundry baskets, pots/pans, garbage or grocery bags without difficulty or increased pain  Pt will be able to sleep full nights most nights without waking from LBP/LE symptoms    Progress per Plan of Care and Progress strengthening /stabilization /functional activity            Timed:         Manual Therapy:    4     mins  80029;     Therapeutic Exercise:    18     mins  77811;     Neuromuscular Kay:   8     mins  40932;    Therapeutic Activity:    12      mins  55629;     Gait Training:           mins  39855;      Ultrasound:         mins  79616;    Ionto                                   mins   24168  Self Care                            mins   38870  Canalith Repos                   mins  87287    Un-Timed:  Electrical Stimulation:         mins  51509 ( );  Dry Needling          mins self-pay  Traction          mins 51689  Low Eval          Mins  46437  Mod Eval          Mins  92988  High Eval                           Mins  22094  Re-Eval                               mins  50575    Timed Treatment:  42    mins   Total Treatment:     42   mins    Zoraida Schultz PT  [place] License # [id number]  Physical Therapist

## 2021-08-19 ENCOUNTER — TREATMENT (OUTPATIENT)
Dept: PHYSICAL THERAPY | Facility: CLINIC | Age: 51
End: 2021-08-19

## 2021-08-19 DIAGNOSIS — M99.04 SEGMENTAL AND SOMATIC DYSFUNCTION OF SACRAL REGION: ICD-10-CM

## 2021-08-19 DIAGNOSIS — M99.05 SEGMENTAL AND SOMATIC DYSFUNCTION OF PELVIC REGION: ICD-10-CM

## 2021-08-19 DIAGNOSIS — M25.551 RIGHT HIP PAIN: Primary | ICD-10-CM

## 2021-08-19 PROCEDURE — 97110 THERAPEUTIC EXERCISES: CPT | Performed by: PHYSICAL THERAPIST

## 2021-08-19 PROCEDURE — 97530 THERAPEUTIC ACTIVITIES: CPT | Performed by: PHYSICAL THERAPIST

## 2021-08-19 PROCEDURE — 97112 NEUROMUSCULAR REEDUCATION: CPT | Performed by: PHYSICAL THERAPIST

## 2021-08-19 NOTE — PROGRESS NOTES
Physical Therapy Daily Progress Note    VISIT#: 7    Subjective   Marialuisa Pelletier reports she forgot to take her meds this morning but she is actually feeling really good and is pain free.  Current Pain Level: 0/10    Objective     See Exercise, Manual, and Modality Logs for complete treatment.     Patient Education: continue current HEP.    Assessment/Plan  Patient still presents with some weakness in B hip flexors. Has difficulty with the coordination/performance of the table tops due to the weak hip flexors. Her pain level has remained very well controlled since receiving both injections she is just deconditioned and can experience some jamie horses in her back and legs with the standing activities.     Plan: cont 1x per week for 2-3 more weeks then try HEP on her own for a few weeks before DC.  Progress per Plan of Care    Goals  Plan Goals: STGs in 4 weeks:  Decrease pain to 6-7/10 on average Met  Increase LE ROM by 10 degrees where limited as much Partially Met  Increase LE strength to 4/5 Met  Improve pelvic/sacral alignment during sessions Met    LTGs by discharge  Increase R hip AROM to WFL/WNL and pain free  Increase LE strength to 5/5   Pt will be able to ascend/descend stairs reciprocally with or without use of rail(s) and with minimal difficulty or pain  Pt will be able to sit/drive/ride for 30-60 mins without difficulty or pain  Pt will be able to stand 2 hrs consecutively for basic ADLs/work activities without difficulty or pain  Pt will be able to walk 30-60 mins for grocery shopping without difficulty, pain or LOB   Pt will be able to wash/dress/groom without difficulty or increased pain  Pt will be able to lift/carry laundry baskets, pots/pans, garbage or grocery bags without difficulty or increased pain  Pt will be able to sleep full nights most nights without waking from LBP/LE symptoms          Timed:            Therapeutic Exercise:    16     mins  25338;     Neuromuscular Kay:    18    mins   26484;    Therapeutic Activity:     10     mins  50357;         Un-Timed:  NuStep: 10 min (no charge)      Timed Treatment:   44   mins   Total Treatment:     54   mins      Fabby Grajeda PTA    Physical Therapist Assistant

## 2021-08-26 ENCOUNTER — TREATMENT (OUTPATIENT)
Dept: PHYSICAL THERAPY | Facility: CLINIC | Age: 51
End: 2021-08-26

## 2021-08-26 DIAGNOSIS — M54.50 CHRONIC LOW BACK PAIN, UNSPECIFIED BACK PAIN LATERALITY, UNSPECIFIED WHETHER SCIATICA PRESENT: ICD-10-CM

## 2021-08-26 DIAGNOSIS — M99.05 SEGMENTAL AND SOMATIC DYSFUNCTION OF PELVIC REGION: ICD-10-CM

## 2021-08-26 DIAGNOSIS — M99.04 SEGMENTAL AND SOMATIC DYSFUNCTION OF SACRAL REGION: ICD-10-CM

## 2021-08-26 DIAGNOSIS — M79.18 MYALGIA, OTHER SITE: ICD-10-CM

## 2021-08-26 DIAGNOSIS — G89.29 CHRONIC LOW BACK PAIN, UNSPECIFIED BACK PAIN LATERALITY, UNSPECIFIED WHETHER SCIATICA PRESENT: ICD-10-CM

## 2021-08-26 DIAGNOSIS — M25.551 RIGHT HIP PAIN: Primary | ICD-10-CM

## 2021-08-26 PROCEDURE — 97110 THERAPEUTIC EXERCISES: CPT | Performed by: PHYSICAL THERAPIST

## 2021-08-26 PROCEDURE — 97112 NEUROMUSCULAR REEDUCATION: CPT | Performed by: PHYSICAL THERAPIST

## 2021-08-26 PROCEDURE — 97530 THERAPEUTIC ACTIVITIES: CPT | Performed by: PHYSICAL THERAPIST

## 2021-08-26 NOTE — PROGRESS NOTES
Physical Therapy Daily Progress Note    VISIT#: 8    Subjective   Marialuisa Pelletier reports she is still doing very well. No pain in her hip or back. 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  Current Pain Level: 0/10    Objective     See Exercise, Manual, and Modality Logs for complete treatment.     Patient Education: continue current HEP    Assessment/Plan  Patient was able to tolerate added weight to her SLR's today without issue. She still presents with some deconditioning but expected to improve over time with continued exercise. Good compliance with her HEP. Next visit will discuss ways to progress her exercises at home after DC    Plan: Patient would like to come 1 more time next week and DC at that time. Finalize HEP  Progress per Plan of Care     Goals  Plan Goals: STGs in 4 weeks:  Decrease pain to 6-7/10 on average Met  Increase LE ROM by 10 degrees where limited as much Partially Met  Increase LE strength to 4/5 Met  Improve pelvic/sacral alignment during sessions Met    LTGs by discharge  Increase R hip AROM to WFL/WNL and pain free  Increase LE strength to 5/5   Pt will be able to ascend/descend stairs reciprocally with or without use of rail(s) and with minimal difficulty or pain  Pt will be able to sit/drive/ride for 30-60 mins without difficulty or pain  Pt will be able to stand 2 hrs consecutively for basic ADLs/work activities without difficulty or pain  Pt will be able to walk 30-60 mins for grocery shopping without difficulty, pain or LOB   Pt will be able to wash/dress/groom without difficulty or increased pain  Pt will be able to lift/carry laundry baskets, pots/pans, garbage or grocery bags without difficulty or increased pain  Pt will be able to sleep full nights most nights without waking from LBP/LE symptoms          Timed:            Therapeutic Exercise:    12     mins  99351;     Neuromuscular Kay:    15    mins   92680;    Therapeutic Activity:     14     mins  18183;       Un-Timed:  NuStep: 7 min (no charge)    Timed Treatment:   41   mins   Total Treatment:     48   mins      Fabby Grajeda PTA    Physical Therapist Assistant

## 2021-08-31 ENCOUNTER — TELEPHONE (OUTPATIENT)
Dept: PHYSICAL THERAPY | Facility: CLINIC | Age: 51
End: 2021-08-31

## 2022-10-19 NOTE — PROGRESS NOTES
HEMATOLOGY ONCOLOGY FOLLOW UP        Patient name: Marialuisa Pelletier  : 1970  MRN: [id number]  Primary Care Physician: Dain Valenzuela MD  Referring Physician: Diane Cartagena AP*  Reason For Consult:       History of Present Illness:    10/21/2022: In the office for the first time to investigate easy bruisability. Reported this had started approximately 2 years before, without any obvious provocation. It had persisted without much change up until the time of the visit. She had been taking ibuprofen 2 to 3 times per week but the bruising had started before. She also was receiving testosterone but this also was started after the bruising was already present. She had undergone several surgeries as documented in her history without undue bleeding. She also had undergone extraction of a tooth without any undue bleeding. She had no family history of a bleeding disorder. On exam she indeed had a few ecchymoses. No petechiae. A decision was made to investigate further but the impression was that she did not have a bleeding diathesis.     Subjective:  10/21/2022: In the office for the first time. Reported essentially no symptoms, other than easy bruisability. She had been active and free of limitations. She reported good appetite and no weight loss. Denied fevers or nocturnal diaphoresis. No chest pain, cough or dyspnea. No dysphagia or abdominal pain. Denied diarrhea. No dysuria. Reported amenorrhea since her hysterectomy several years before. No edema. No skin rash.     The following portions of the patient's history were reviewed and updated as appropriate: allergies, current medications, past family history, past medical history, past social history, past surgical history and problem list.    History reviewed. No pertinent past medical history.    Past Surgical History:   Procedure Laterality Date   • AUGMENTATION MAMMAPLASTY Bilateral    • CHOLECYSTECTOMY     • HYSTERECTOMY     • LIPOSUCTION  ABDOMINAL         Current Outpatient Medications:   •  estradiol (ESTRACE) 2 MG tablet, , Disp: , Rfl:   •  gabapentin (NEURONTIN) 800 MG tablet, , Disp: , Rfl:   •  oxybutynin XL (DITROPAN-XL) 10 MG 24 hr tablet, , Disp: , Rfl:   •  Progesterone (PROMETRIUM) 200 MG capsule, Take 1 capsule by mouth Daily., Disp: , Rfl:   •  Testosterone Cypionate (DEPOTESTOTERONE CYPIONATE) 200 MG/ML injection, INJECT 0.13ml INTRAMUSCULARLY WEEKLY, Disp: , Rfl:   •  traMADol (ULTRAM) 50 MG tablet, Take 1 tablet by mouth Every 8 (Eight) Hours As Needed., Disp: , Rfl:     No Known Allergies    Family History   Problem Relation Age of Onset   • Heart attack Mother    • Stroke Father    • Cancer Sister 48        Sarcoma?   • Cancer Brother 32        Sarcoma?     Cancer-related family history includes Cancer (age of onset: 32) in her brother; Cancer (age of onset: 48) in her sister.    Social History     Tobacco Use   • Smoking status: Former     Packs/day: 0.25     Years: 32.00     Pack years: 8.00     Types: Cigarettes     Start date:      Quit date:      Years since quittin.8   • Smokeless tobacco: Never   Vaping Use   • Vaping Use: Never used   Substance Use Topics   • Drug use: Never     Social History     Social History Narrative   • Not on file      I have reviewed the history of present illness, past medical history, family history, social history, lab results, all notes and other records since the patient was last seen on  Visit date not found.    ROS:   Review of Systems   Constitutional: Negative for activity change, appetite change, chills, diaphoresis, fatigue, fever and unexpected weight change.   HENT: Negative for congestion, dental problem, drooling, ear discharge, ear pain, facial swelling, hearing loss, mouth sores, nosebleeds, postnasal drip, rhinorrhea, sinus pressure, sinus pain, sneezing, sore throat, tinnitus, trouble swallowing and voice change.    Eyes: Negative for photophobia, pain, discharge,  "redness, itching and visual disturbance.   Respiratory: Negative for apnea, cough, choking, chest tightness, shortness of breath, wheezing and stridor.    Cardiovascular: Negative for chest pain, palpitations and leg swelling.   Gastrointestinal: Negative for abdominal distention, abdominal pain, anal bleeding, blood in stool, constipation, diarrhea, nausea, rectal pain and vomiting.   Endocrine: Negative for cold intolerance, heat intolerance, polydipsia and polyuria.   Genitourinary: Negative for decreased urine volume, difficulty urinating, dysuria, flank pain, frequency, genital sores, hematuria and urgency.   Musculoskeletal: Negative for arthralgias, back pain, gait problem, joint swelling, myalgias, neck pain and neck stiffness.   Skin: Negative for color change, pallor and rash.   Neurological: Negative for dizziness, tremors, seizures, syncope, facial asymmetry, speech difficulty, weakness, light-headedness, numbness and headaches.   Hematological: Negative for adenopathy. Bruises/bleeds easily.   Psychiatric/Behavioral: Negative for agitation, behavioral problems, confusion, decreased concentration, hallucinations, self-injury, sleep disturbance and suicidal ideas. The patient is not nervous/anxious.      Objective:  Vital signs:  Vitals:    10/21/22 0930   BP: 121/77   Pulse: 71   Temp: 97.7 °F (36.5 °C)   SpO2: 100%   Weight: 68 kg (150 lb)  Comment: verbal   Height: 157.5 cm (62\")   PainSc: 0-No pain     Body mass index is 27.44 kg/m².  ECOG  (0) Fully active, able to carry on all predisease performance without restriction    Physical Exam:   Physical Exam  Constitutional:       General: She is not in acute distress.     Appearance: Normal appearance. She is not ill-appearing, toxic-appearing or diaphoretic.   HENT:      Head: Normocephalic and atraumatic.      Right Ear: External ear normal.      Left Ear: External ear normal.      Nose: Nose normal.      Mouth/Throat:      Mouth: Mucous membranes are " moist.      Pharynx: Oropharynx is clear. No oropharyngeal exudate or posterior oropharyngeal erythema.   Eyes:      General: No scleral icterus.        Right eye: No discharge.         Left eye: No discharge.      Conjunctiva/sclera: Conjunctivae normal.      Pupils: Pupils are equal, round, and reactive to light.   Cardiovascular:      Rate and Rhythm: Normal rate and regular rhythm.      Pulses: Normal pulses.      Heart sounds: No murmur heard.    No friction rub. No gallop.   Pulmonary:      Effort: No respiratory distress.      Breath sounds: No stridor. No wheezing, rhonchi or rales.   Abdominal:      General: Abdomen is flat. Bowel sounds are normal. There is no distension.      Palpations: Abdomen is soft. There is no mass.      Tenderness: There is no abdominal tenderness. There is no right CVA tenderness, left CVA tenderness, guarding or rebound.      Hernia: No hernia is present.   Musculoskeletal:         General: No swelling, tenderness, deformity or signs of injury.      Cervical back: No rigidity.      Right lower leg: No edema.      Left lower leg: No edema.   Lymphadenopathy:      Cervical: No cervical adenopathy.   Skin:     Coloration: Skin is not jaundiced.      Findings: No bruising, lesion or rash.   Neurological:      General: No focal deficit present.      Mental Status: She is alert and oriented to person, place, and time.      Cranial Nerves: No cranial nerve deficit.      Motor: No weakness.      Gait: Gait normal.   Psychiatric:         Mood and Affect: Mood normal.         Behavior: Behavior normal.         Thought Content: Thought content normal.         Judgment: Judgment normal.     BLANCA Gould MD performed the physical exam on 10/21/2022 as documented above.     Assessment & Plan       Assessment:  1. Easy bruisability.. No suggestion of a bleeding diathesis or any sort. Indeed she has some ecchymoses, and she reports that it happens often. She has been receiving non-steroidal  anti-inflammatory drugs and testosterone, both of which could result in bruisability, but they were started before the symptoms and thus are unlikely to be the cause of the problem. Will investigate further. Discussed with her.   2. Combined estrogen/progesterone postmenopausal replacement. Discussed with her the increased risk of breast cancer with this combination. She has had a hysterectomy and thus could receive only unopposed estrogen. Explained to her and deferred to her nurse practitioner.   3. She is to see me in 3 weeks with laboratory exams.     Plan:  1. As above.     Reji Gould MD on 10/21/2022 at 11:21 AM.

## 2022-10-21 ENCOUNTER — CONSULT (OUTPATIENT)
Dept: ONCOLOGY | Facility: CLINIC | Age: 52
End: 2022-10-21

## 2022-10-21 ENCOUNTER — LAB (OUTPATIENT)
Dept: LAB | Facility: HOSPITAL | Age: 52
End: 2022-10-21

## 2022-10-21 VITALS
SYSTOLIC BLOOD PRESSURE: 121 MMHG | DIASTOLIC BLOOD PRESSURE: 77 MMHG | TEMPERATURE: 97.7 F | WEIGHT: 150 LBS | HEIGHT: 62 IN | OXYGEN SATURATION: 100 % | HEART RATE: 71 BPM | BODY MASS INDEX: 27.6 KG/M2

## 2022-10-21 DIAGNOSIS — R23.3 BRUISES EASILY: ICD-10-CM

## 2022-10-21 DIAGNOSIS — R23.3 BRUISES EASILY: Primary | ICD-10-CM

## 2022-10-21 LAB
ALBUMIN SERPL-MCNC: 4.1 G/DL (ref 3.5–5.2)
ALBUMIN/GLOB SERPL: 2 G/DL
ALP SERPL-CCNC: 53 U/L (ref 39–117)
ALT SERPL W P-5'-P-CCNC: 16 U/L (ref 1–33)
ANION GAP SERPL CALCULATED.3IONS-SCNC: 9 MMOL/L (ref 5–15)
APTT PPP: 24.8 SECONDS (ref 24–31)
AST SERPL-CCNC: 17 U/L (ref 1–32)
BASOPHILS # BLD AUTO: 0.01 10*3/MM3 (ref 0–0.2)
BASOPHILS NFR BLD AUTO: 0.1 % (ref 0–1.5)
BILIRUB SERPL-MCNC: <0.2 MG/DL (ref 0–1.2)
BUN SERPL-MCNC: 28 MG/DL (ref 6–20)
BUN/CREAT SERPL: 40 (ref 7–25)
CALCIUM SPEC-SCNC: 9 MG/DL (ref 8.6–10.5)
CHLORIDE SERPL-SCNC: 101 MMOL/L (ref 98–107)
CLOSURE TME COLL+ADP BLD: NORMAL S
CLOSURE TME COLL+EPINEP BLD: 87 SECONDS (ref 64–160)
CO2 SERPL-SCNC: 27 MMOL/L (ref 22–29)
CREAT SERPL-MCNC: 0.7 MG/DL (ref 0.57–1)
DEPRECATED RDW RBC AUTO: 44.2 FL (ref 37–54)
EGFRCR SERPLBLD CKD-EPI 2021: 105.5 ML/MIN/1.73
EOSINOPHIL # BLD AUTO: 0.29 10*3/MM3 (ref 0–0.4)
EOSINOPHIL NFR BLD AUTO: 3.9 % (ref 0.3–6.2)
ERYTHROCYTE [DISTWIDTH] IN BLOOD BY AUTOMATED COUNT: 14.2 % (ref 12.3–15.4)
GLOBULIN UR ELPH-MCNC: 2.1 GM/DL
GLUCOSE SERPL-MCNC: 97 MG/DL (ref 65–99)
HCT VFR BLD AUTO: 39.3 % (ref 34–46.6)
HGB BLD-MCNC: 12.9 G/DL (ref 12–15.9)
INR PPP: 0.95 (ref 0.93–1.1)
LYMPHOCYTES # BLD AUTO: 2.26 10*3/MM3 (ref 0.7–3.1)
LYMPHOCYTES NFR BLD AUTO: 30.4 % (ref 19.6–45.3)
MCH RBC QN AUTO: 28.5 PG (ref 26.6–33)
MCHC RBC AUTO-ENTMCNC: 32.8 G/DL (ref 31.5–35.7)
MCV RBC AUTO: 86.9 FL (ref 79–97)
MONOCYTES # BLD AUTO: 0.7 10*3/MM3 (ref 0.1–0.9)
MONOCYTES NFR BLD AUTO: 9.4 % (ref 5–12)
NEUTROPHILS NFR BLD AUTO: 4.18 10*3/MM3 (ref 1.7–7)
NEUTROPHILS NFR BLD AUTO: 56.2 % (ref 42.7–76)
PLATELET # BLD AUTO: 199 10*3/MM3 (ref 140–450)
PMV BLD AUTO: 11.5 FL (ref 6–12)
POTASSIUM SERPL-SCNC: 4.5 MMOL/L (ref 3.5–5.2)
PROT SERPL-MCNC: 6.2 G/DL (ref 6–8.5)
PROTHROMBIN TIME: 9.8 SECONDS (ref 9.6–11.7)
RBC # BLD AUTO: 4.52 10*6/MM3 (ref 3.77–5.28)
SODIUM SERPL-SCNC: 137 MMOL/L (ref 136–145)
WBC NRBC COR # BLD: 7.44 10*3/MM3 (ref 3.4–10.8)

## 2022-10-21 PROCEDURE — 80053 COMPREHEN METABOLIC PANEL: CPT | Performed by: INTERNAL MEDICINE

## 2022-10-21 PROCEDURE — 85576 BLOOD PLATELET AGGREGATION: CPT | Performed by: INTERNAL MEDICINE

## 2022-10-21 PROCEDURE — 85610 PROTHROMBIN TIME: CPT | Performed by: INTERNAL MEDICINE

## 2022-10-21 PROCEDURE — 36415 COLL VENOUS BLD VENIPUNCTURE: CPT | Performed by: INTERNAL MEDICINE

## 2022-10-21 PROCEDURE — 85025 COMPLETE CBC W/AUTO DIFF WBC: CPT

## 2022-10-21 PROCEDURE — 85730 THROMBOPLASTIN TIME PARTIAL: CPT | Performed by: INTERNAL MEDICINE

## 2022-10-21 PROCEDURE — 99204 OFFICE O/P NEW MOD 45 MIN: CPT | Performed by: INTERNAL MEDICINE

## 2022-10-21 RX ORDER — PROGESTERONE 200 MG/1
200 CAPSULE ORAL DAILY
COMMUNITY

## 2022-10-21 RX ORDER — TRAMADOL HYDROCHLORIDE 50 MG/1
50 TABLET ORAL EVERY 8 HOURS PRN
COMMUNITY
Start: 2019-10-20

## 2022-10-21 RX ORDER — GABAPENTIN 800 MG/1
TABLET ORAL
COMMUNITY
Start: 2022-09-27

## 2022-10-21 RX ORDER — OXYBUTYNIN CHLORIDE 10 MG/1
TABLET, EXTENDED RELEASE ORAL
COMMUNITY
Start: 2022-09-27

## 2022-10-21 RX ORDER — ESTRADIOL 2 MG/1
TABLET ORAL
COMMUNITY
Start: 2022-09-27

## 2022-10-21 RX ORDER — TESTOSTERONE CYPIONATE 200 MG/ML
INJECTION, SOLUTION INTRAMUSCULAR
COMMUNITY
Start: 2022-10-13

## 2022-11-08 NOTE — PROGRESS NOTES
HEMATOLOGY ONCOLOGY FOLLOW UP        Patient name: Marialuisa Pelletier  : 1970  MRN: [id number]  Primary Care Physician: Dain Valenzuela MD  Referring Physician: Dain Valenzuela MD  Reason For Consult:       History of Present Illness:    10/21/2022: In the office for the first time to investigate easy bruisability. Reported this had started approximately 2 years before, without any obvious provocation. It had persisted without much change up until the time of the visit. She had been taking ibuprofen 2 to 3 times per week but the bruising had started before. She also was receiving testosterone but this also was started after the bruising was already present. She had undergone several surgeries as documented in her history without undue bleeding. She also had undergone extraction of a tooth without any undue bleeding. She had no family history of a bleeding disorder. On exam she indeed had a few ecchymoses. No petechiae. A decision was made to investigate further but the impression was that she did not have a bleeding diathesis.     2022: Back for follow up and to review the result of laboratory exams. Entirely asymptomatic. All laboratory exams were unremarkable. A decision was made to follow only on an as needed basis.     Subjective:  2022:Back in the office for follow up. Feels well and has been active and eating well. Planning a trip. Afebrile and without weight loss. No bleeding but persisted with some bruising.     The following portions of the patient's history were reviewed and updated as appropriate: allergies, current medications, past family history, past medical history, past social history, past surgical history and problem list.    No past medical history on file.    Past Surgical History:   Procedure Laterality Date   • AUGMENTATION MAMMAPLASTY Bilateral    • CHOLECYSTECTOMY     • HYSTERECTOMY     • LIPOSUCTION ABDOMINAL         Current Outpatient Medications:   •   estradiol (ESTRACE) 2 MG tablet, , Disp: , Rfl:   •  gabapentin (NEURONTIN) 800 MG tablet, , Disp: , Rfl:   •  oxybutynin XL (DITROPAN-XL) 10 MG 24 hr tablet, , Disp: , Rfl:   •  Progesterone (PROMETRIUM) 200 MG capsule, Take 1 capsule by mouth Daily., Disp: , Rfl:   •  Testosterone Cypionate (DEPOTESTOTERONE CYPIONATE) 200 MG/ML injection, INJECT 0.13ml INTRAMUSCULARLY WEEKLY, Disp: , Rfl:   •  traMADol (ULTRAM) 50 MG tablet, Take 1 tablet by mouth Every 8 (Eight) Hours As Needed., Disp: , Rfl:     No Known Allergies    Family History   Problem Relation Age of Onset   • Heart attack Mother    • Stroke Father    • Cancer Sister 48        Sarcoma?   • Cancer Brother 32        Sarcoma?     Cancer-related family history includes Cancer (age of onset: 32) in her brother; Cancer (age of onset: 48) in her sister.    Social History     Tobacco Use   • Smoking status: Former     Packs/day: 0.25     Years: 32.00     Pack years: 8.00     Types: Cigarettes     Start date:      Quit date:      Years since quittin.8   • Smokeless tobacco: Never   Vaping Use   • Vaping Use: Never used   Substance Use Topics   • Drug use: Never     Social History     Social History Narrative   • Not on file      I have reviewed the history of present illness, past medical history, family history, social history, lab results, all notes and other records since the patient was last seen on  Visit date not found.    ROS:   Review of Systems   Constitutional: Negative for activity change, appetite change, chills, diaphoresis, fatigue, fever and unexpected weight change.   HENT: Negative for congestion, dental problem, drooling, ear discharge, ear pain, facial swelling, hearing loss, mouth sores, nosebleeds, postnasal drip, rhinorrhea, sinus pressure, sinus pain, sneezing, sore throat, tinnitus, trouble swallowing and voice change.    Eyes: Negative for photophobia, pain, discharge, redness, itching and visual disturbance.   Respiratory:  Negative for apnea, cough, choking, chest tightness, shortness of breath, wheezing and stridor.    Cardiovascular: Negative for chest pain, palpitations and leg swelling.   Gastrointestinal: Negative for abdominal distention, abdominal pain, anal bleeding, blood in stool, constipation, diarrhea, nausea, rectal pain and vomiting.   Endocrine: Negative for cold intolerance, heat intolerance, polydipsia and polyuria.   Genitourinary: Negative for decreased urine volume, difficulty urinating, dysuria, flank pain, frequency, genital sores, hematuria and urgency.   Musculoskeletal: Negative for arthralgias, back pain, gait problem, joint swelling, myalgias, neck pain and neck stiffness.   Skin: Negative for color change, pallor and rash.   Neurological: Negative for dizziness, tremors, seizures, syncope, facial asymmetry, speech difficulty, weakness, light-headedness, numbness and headaches.   Hematological: Negative for adenopathy. Bruises/bleeds easily.   Psychiatric/Behavioral: Negative for agitation, behavioral problems, confusion, decreased concentration, hallucinations, self-injury, sleep disturbance and suicidal ideas. The patient is not nervous/anxious.      Objective:  Vital signs:  There were no vitals filed for this visit.  There is no height or weight on file to calculate BMI.  ECOG  (0) Fully active, able to carry on all predisease performance without restriction    Physical Exam:   Physical Exam  Constitutional:       General: She is not in acute distress.     Appearance: Normal appearance. She is not ill-appearing, toxic-appearing or diaphoretic.   HENT:      Head: Normocephalic and atraumatic.      Right Ear: External ear normal.      Left Ear: External ear normal.      Nose: Nose normal.      Mouth/Throat:      Mouth: Mucous membranes are moist.      Pharynx: Oropharynx is clear. No oropharyngeal exudate or posterior oropharyngeal erythema.   Eyes:      General: No scleral icterus.        Right eye: No  discharge.         Left eye: No discharge.      Conjunctiva/sclera: Conjunctivae normal.      Pupils: Pupils are equal, round, and reactive to light.   Cardiovascular:      Rate and Rhythm: Normal rate and regular rhythm.      Pulses: Normal pulses.      Heart sounds: No murmur heard.    No friction rub. No gallop.   Pulmonary:      Effort: No respiratory distress.      Breath sounds: No stridor. No wheezing, rhonchi or rales.   Abdominal:      General: Abdomen is flat. Bowel sounds are normal. There is no distension.      Palpations: Abdomen is soft. There is no mass.      Tenderness: There is no abdominal tenderness. There is no right CVA tenderness, left CVA tenderness, guarding or rebound.      Hernia: No hernia is present.   Musculoskeletal:         General: No swelling, tenderness, deformity or signs of injury.      Cervical back: No rigidity.      Right lower leg: No edema.      Left lower leg: No edema.   Lymphadenopathy:      Cervical: No cervical adenopathy.   Skin:     Coloration: Skin is not jaundiced.      Findings: No bruising, lesion or rash.   Neurological:      General: No focal deficit present.      Mental Status: She is alert and oriented to person, place, and time.      Cranial Nerves: No cranial nerve deficit.      Motor: No weakness.      Gait: Gait normal.   Psychiatric:         Mood and Affect: Mood normal.         Behavior: Behavior normal.         Thought Content: Thought content normal.         Judgment: Judgment normal.     BLANCA Gould MD performed the physical exam on 11/11/2022 as documented above.     Assessment & Plan       Assessment:  1. Easy bruisability: Without evidence of a bleeding diathesis. Previous challenges have not resulted in bleeding. All laboratory exams including the blood count, pt and ptt and platelet function are within normal limites. Will follow only on an as needed basis.   2. Reviewed all laboratory exams and discussed with her and her spouse.        Plan:  1. As above    Reji Gould MD on 11/11/2022 at 10:16 AM.

## 2022-11-11 ENCOUNTER — APPOINTMENT (OUTPATIENT)
Dept: LAB | Facility: HOSPITAL | Age: 52
End: 2022-11-11

## 2022-11-11 ENCOUNTER — OFFICE VISIT (OUTPATIENT)
Dept: ONCOLOGY | Facility: CLINIC | Age: 52
End: 2022-11-11

## 2022-11-11 VITALS
TEMPERATURE: 96.8 F | SYSTOLIC BLOOD PRESSURE: 134 MMHG | WEIGHT: 152 LBS | HEART RATE: 70 BPM | HEIGHT: 62 IN | BODY MASS INDEX: 27.97 KG/M2 | OXYGEN SATURATION: 98 % | DIASTOLIC BLOOD PRESSURE: 85 MMHG

## 2022-11-11 DIAGNOSIS — R23.3 BRUISES EASILY: Primary | ICD-10-CM

## 2022-11-11 LAB
BASOPHILS # BLD AUTO: 0 10*3/MM3 (ref 0–0.2)
BASOPHILS NFR BLD AUTO: 0 % (ref 0–1.5)
DEPRECATED RDW RBC AUTO: 43 FL (ref 37–54)
EOSINOPHIL # BLD AUTO: 0 10*3/MM3 (ref 0–0.4)
EOSINOPHIL NFR BLD AUTO: 0 % (ref 0.3–6.2)
ERYTHROCYTE [DISTWIDTH] IN BLOOD BY AUTOMATED COUNT: 13.7 % (ref 12.3–15.4)
HCT VFR BLD AUTO: 38.5 % (ref 34–46.6)
HGB BLD-MCNC: 12.6 G/DL (ref 12–15.9)
HOLD SPECIMEN: NORMAL
HOLD SPECIMEN: NORMAL
LYMPHOCYTES # BLD AUTO: 1 10*3/MM3 (ref 0.7–3.1)
LYMPHOCYTES NFR BLD AUTO: 10.4 % (ref 19.6–45.3)
MCH RBC QN AUTO: 28.7 PG (ref 26.6–33)
MCHC RBC AUTO-ENTMCNC: 32.7 G/DL (ref 31.5–35.7)
MCV RBC AUTO: 87.7 FL (ref 79–97)
MONOCYTES # BLD AUTO: 0.83 10*3/MM3 (ref 0.1–0.9)
MONOCYTES NFR BLD AUTO: 8.7 % (ref 5–12)
NEUTROPHILS NFR BLD AUTO: 7.75 10*3/MM3 (ref 1.7–7)
NEUTROPHILS NFR BLD AUTO: 80.9 % (ref 42.7–76)
PLATELET # BLD AUTO: 282 10*3/MM3 (ref 140–450)
PMV BLD AUTO: 10.4 FL (ref 6–12)
RBC # BLD AUTO: 4.39 10*6/MM3 (ref 3.77–5.28)
WBC NRBC COR # BLD: 9.58 10*3/MM3 (ref 3.4–10.8)

## 2022-11-11 PROCEDURE — 85025 COMPLETE CBC W/AUTO DIFF WBC: CPT | Performed by: INTERNAL MEDICINE

## 2022-11-11 PROCEDURE — 99213 OFFICE O/P EST LOW 20 MIN: CPT | Performed by: INTERNAL MEDICINE

## 2022-11-11 PROCEDURE — 36415 COLL VENOUS BLD VENIPUNCTURE: CPT

## 2022-11-15 ENCOUNTER — DOCUMENTATION (OUTPATIENT)
Dept: PHYSICAL THERAPY | Facility: CLINIC | Age: 52
End: 2022-11-15

## 2022-11-15 NOTE — PROGRESS NOTES
Discharge Summary  Discharge Summary from Physical Therapy Report      Dates  PT visit: 7/15/21-8/26/21  Number of Visits: 8    Discharge Status of Patient: pt was going to come 1x before discharge, but ended up canceling appt due to migraine and never returned to PT.     Goals  Plan Goals: STGs in 4 weeks:  Decrease pain to 6-7/10 on average Met  Increase LE ROM by 10 degrees where limited as much Partially Met  Increase LE strength to 4/5 Met  Improve pelvic/sacral alignment during sessions Met    LTGs by discharge  Increase R hip AROM to WFL/WNL and pain free  Increase LE strength to 5/5   Pt will be able to ascend/descend stairs reciprocally with or without use of rail(s) and with minimal difficulty or pain  Pt will be able to sit/drive/ride for 30-60 mins without difficulty or pain  Pt will be able to stand 2 hrs consecutively for basic ADLs/work activities without difficulty or pain  Pt will be able to walk 30-60 mins for grocery shopping without difficulty, pain or LOB   Pt will be able to wash/dress/groom without difficulty or increased pain  Pt will be able to lift/carry laundry baskets, pots/pans, garbage or grocery bags without difficulty or increased pain  Pt will be able to sleep full nights most nights without waking from LBP/LE symptoms    Discharge Plan: Continue with current home exercise program as instructed      Comments: Pt was given HEP during sessions.     Date of Discharge: 11/15/22        Zoraida Schultz, PT  Physical Therapist

## 2025-04-28 ENCOUNTER — TRANSCRIBE ORDERS (OUTPATIENT)
Dept: ADMINISTRATIVE | Facility: HOSPITAL | Age: 55
End: 2025-04-28
Payer: COMMERCIAL

## 2025-04-28 DIAGNOSIS — I87.323 IDIOPATHIC CHRONIC VENOUS HYPERTENSION OF BOTH LOWER EXTREMITIES WITH INFLAMMATION: Primary | ICD-10-CM

## 2025-05-22 ENCOUNTER — HOSPITAL ENCOUNTER (OUTPATIENT)
Dept: CARDIOLOGY | Facility: HOSPITAL | Age: 55
Discharge: HOME | End: 2025-05-22
Admitting: NURSE PRACTITIONER
Payer: COMMERCIAL

## 2025-05-22 DIAGNOSIS — I87.323 IDIOPATHIC CHRONIC VENOUS HYPERTENSION OF BOTH LOWER EXTREMITIES WITH INFLAMMATION: ICD-10-CM

## 2025-05-22 LAB
AORTIC DIMENSIONLESS INDEX: 0.94 (DI)
AV MEAN PRESS GRAD SYS DOP V1V2: 3 MMHG
AV VMAX SYS DOP: 117 CM/SEC
BH CV ECHO LEFT VENTRICLE GLOBAL LONGITUDINAL STRAIN: -20.7 %
BH CV ECHO MEAS - ACS: 1.8 CM
BH CV ECHO MEAS - AO MAX PG: 5.5 MMHG
BH CV ECHO MEAS - AO V2 VTI: 25.2 CM
BH CV ECHO MEAS - AVA(I,D): 1.9 CM2
BH CV ECHO MEAS - EDV(CUBED): 79.5 ML
BH CV ECHO MEAS - EDV(MOD-SP4): 85.7 ML
BH CV ECHO MEAS - EF(MOD-SP4): 67.4 %
BH CV ECHO MEAS - ESV(CUBED): 32.8 ML
BH CV ECHO MEAS - ESV(MOD-SP4): 27.9 ML
BH CV ECHO MEAS - FS: 25.6 %
BH CV ECHO MEAS - IVS/LVPW: 1.25 CM
BH CV ECHO MEAS - IVSD: 1 CM
BH CV ECHO MEAS - LA DIMENSION: 3.1 CM
BH CV ECHO MEAS - LAT PEAK E' VEL: 15.6 CM/SEC
BH CV ECHO MEAS - LV MASS(C)D: 123.3 GRAMS
BH CV ECHO MEAS - LV MAX PG: 5.1 MMHG
BH CV ECHO MEAS - LV MEAN PG: 2 MMHG
BH CV ECHO MEAS - LV V1 MAX: 113 CM/SEC
BH CV ECHO MEAS - LV V1 VTI: 23.8 CM
BH CV ECHO MEAS - LVIDD: 4.3 CM
BH CV ECHO MEAS - LVIDS: 3.2 CM
BH CV ECHO MEAS - LVOT AREA: 2.01 CM2
BH CV ECHO MEAS - LVOT DIAM: 1.6 CM
BH CV ECHO MEAS - LVPWD: 0.8 CM
BH CV ECHO MEAS - MED PEAK E' VEL: 10.4 CM/SEC
BH CV ECHO MEAS - MV A MAX VEL: 74.9 CM/SEC
BH CV ECHO MEAS - MV DEC SLOPE: 454 CM/SEC2
BH CV ECHO MEAS - MV DEC TIME: 0.28 SEC
BH CV ECHO MEAS - MV E MAX VEL: 100 CM/SEC
BH CV ECHO MEAS - MV E/A: 1.34
BH CV ECHO MEAS - MV MAX PG: 5.8 MMHG
BH CV ECHO MEAS - MV MEAN PG: 2 MMHG
BH CV ECHO MEAS - MV P1/2T: 82.6 MSEC
BH CV ECHO MEAS - MV V2 VTI: 36.6 CM
BH CV ECHO MEAS - MVA(P1/2T): 2.7 CM2
BH CV ECHO MEAS - MVA(VTI): 1.31 CM2
BH CV ECHO MEAS - PA V2 MAX: 89.1 CM/SEC
BH CV ECHO MEAS - PULM A REVS DUR: 0.08 SEC
BH CV ECHO MEAS - PULM A REVS VEL: 37.2 CM/SEC
BH CV ECHO MEAS - PULM DIAS VEL: 32.5 CM/SEC
BH CV ECHO MEAS - PULM S/D: 2.08
BH CV ECHO MEAS - PULM SYS VEL: 67.6 CM/SEC
BH CV ECHO MEAS - QP/QS: 0.45
BH CV ECHO MEAS - RAP SYSTOLE: 3 MMHG
BH CV ECHO MEAS - RV MAX PG: 1.4 MMHG
BH CV ECHO MEAS - RV V1 MAX: 59.1 CM/SEC
BH CV ECHO MEAS - RV V1 VTI: 14.1 CM
BH CV ECHO MEAS - RVDD: 2.8 CM
BH CV ECHO MEAS - RVOT DIAM: 1.4 CM
BH CV ECHO MEAS - RVSP: 31.5 MMHG
BH CV ECHO MEAS - SV(LVOT): 47.9 ML
BH CV ECHO MEAS - SV(MOD-SP4): 57.8 ML
BH CV ECHO MEAS - SV(RVOT): 21.7 ML
BH CV ECHO MEAS - TAPSE (>1.6): 2.36 CM
BH CV ECHO MEAS - TR MAX PG: 28.5 MMHG
BH CV ECHO MEAS - TR MAX VEL: 267 CM/SEC
BH CV ECHO MEASUREMENTS AVERAGE E/E' RATIO: 7.69
BH CV XLRA - TDI S': 16.3 CM/SEC
LV EF 2D ECHO EST: 55 %
SINUS: 3.1 CM
STJ: 2.2 CM

## 2025-05-22 PROCEDURE — 93356 MYOCRD STRAIN IMG SPCKL TRCK: CPT

## 2025-05-22 PROCEDURE — 93306 TTE W/DOPPLER COMPLETE: CPT
